# Patient Record
Sex: FEMALE | Race: WHITE | NOT HISPANIC OR LATINO | URBAN - NONMETROPOLITAN AREA
[De-identification: names, ages, dates, MRNs, and addresses within clinical notes are randomized per-mention and may not be internally consistent; named-entity substitution may affect disease eponyms.]

---

## 2021-03-16 ENCOUNTER — OFFICE VISIT (OUTPATIENT)
Dept: FAMILY MEDICINE CLINIC | Facility: CLINIC | Age: 41
End: 2021-03-16

## 2021-03-16 VITALS
DIASTOLIC BLOOD PRESSURE: 80 MMHG | WEIGHT: 192 LBS | SYSTOLIC BLOOD PRESSURE: 122 MMHG | HEART RATE: 72 BPM | HEIGHT: 64 IN | OXYGEN SATURATION: 97 % | TEMPERATURE: 97.4 F | BODY MASS INDEX: 32.78 KG/M2

## 2021-03-16 DIAGNOSIS — R10.33 PERIUMBILICAL ABDOMINAL PAIN: Primary | ICD-10-CM

## 2021-03-16 DIAGNOSIS — Z86.39 HISTORY OF HYPERLIPIDEMIA: ICD-10-CM

## 2021-03-16 DIAGNOSIS — L91.8 SKIN TAG: ICD-10-CM

## 2021-03-16 PROCEDURE — 99385 PREV VISIT NEW AGE 18-39: CPT | Performed by: NURSE PRACTITIONER

## 2021-03-16 RX ORDER — COPPER 313.4 MG/1
1 INTRAUTERINE DEVICE INTRAUTERINE ONCE
COMMUNITY

## 2021-03-16 RX ORDER — VIT C/B6/B5/MAGNESIUM/HERB 173 50-5-6-5MG
CAPSULE ORAL
COMMUNITY

## 2021-03-16 NOTE — PROGRESS NOTES
New Patient History and Physical      Referring Physician: No ref. provider found    Subjective     Chief Complaint:    Chief Complaint   Patient presents with   • Establish Care   • Suspicious Skin Lesion       History of Present Illness:   Moved here from [place]. Lives in [place].   She feels like she may have an ulcer. She notes abdomonial pain under her belly buttom about an hour after she eats. She has been taking some supplements for digestion. Notes improvement with taking those supplements.   Reports normal daily bowel movement  Does have history of marijuana abuse.  Currently using CBD only  Has hx of gerd. None now.   Has hx of ACL repair  Has hx of elevated cholesterol but was mild. She eats primarly vegetarian.   Has IUD. LMP was 3/10.   She notes a few abnormal skin lesions and skin tags.  She would like removed    Review of Systems   Gen- No fevers, chills  CV- No chest pain, palpitations  Resp- No cough, dyspnea  Neuro-No dizziness, headaches    Past Medical History:   Past Medical History:   Diagnosis Date   • GERD (gastroesophageal reflux disease)        Past Surgical History:  Past Surgical History:   Procedure Laterality Date   • KNEE ACL RECONSTRUCTION         Family History: family history includes Diabetes in her mother; Hypertension in her mother.    Social History:  reports that she quit smoking about 13 months ago. Her smoking use included cigarettes. She has never used smokeless tobacco. She reports current alcohol use. She reports that she does not use drugs.    Medications:    Current Outpatient Medications:   •  ALOE PO, Take  by mouth., Disp: , Rfl:   •  ALPHA LIPOIC ACID-BIOTIN PO, Take  by mouth., Disp: , Rfl:   •  NON FORMULARY, MUSHROOMS, Disp: , Rfl:   •  Paragard Intrauterine Copper intrauterine device IUD, 1 each by Intrauterine route 1 (One) Time., Disp: , Rfl:   •  Probiotic Product (PROBIOTIC-10 PO), Take  by mouth., Disp: , Rfl:   •  Turmeric 500 MG capsule, Take  by  "mouth., Disp: , Rfl:     Allergies:  No Known Allergies    Objective     Vital Signs:   Vitals:    03/16/21 1615   BP: 122/80   Pulse: 72   Temp: 97.4 °F (36.3 °C)   SpO2: 97%   Weight: 87.1 kg (192 lb)   Height: 162.6 cm (64\")   PainSc: 0-No pain       Physical Exam:    Physical Exam  Vitals and nursing note reviewed.   Constitutional:       Appearance: Normal appearance. She is well-developed.   HENT:      Head: Normocephalic and atraumatic.      Right Ear: Tympanic membrane, ear canal and external ear normal.      Left Ear: Tympanic membrane, ear canal and external ear normal.      Nose: Nose normal.      Mouth/Throat:      Pharynx: Uvula midline.   Eyes:      General: Lids are normal. No scleral icterus.     Conjunctiva/sclera: Conjunctivae normal.      Pupils: Pupils are equal, round, and reactive to light.   Neck:      Thyroid: No thyromegaly.      Vascular: No carotid bruit.      Trachea: No tracheal deviation.   Cardiovascular:      Rate and Rhythm: Normal rate and regular rhythm.      Pulses: Normal pulses.      Heart sounds: Normal heart sounds. No murmur. No friction rub. No gallop.    Pulmonary:      Effort: Pulmonary effort is normal.      Breath sounds: Normal breath sounds.   Abdominal:      General: Bowel sounds are normal. There is no distension.      Palpations: Abdomen is soft.      Tenderness: There is no abdominal tenderness.   Musculoskeletal:         General: No swelling.      Cervical back: Neck supple.   Lymphadenopathy:      Head:      Right side of head: No submental, submandibular, tonsillar, preauricular or posterior auricular adenopathy.      Left side of head: No submental, submandibular, tonsillar, preauricular or posterior auricular adenopathy.      Cervical: No cervical adenopathy.   Skin:     General: Skin is warm and dry.      Capillary Refill: Capillary refill takes less than 2 seconds.      Findings: No rash.      Comments: Multiple nevi and skin tags noted   Neurological:      " General: No focal deficit present.      Mental Status: She is alert and oriented to person, place, and time.      Cranial Nerves: No cranial nerve deficit.      Sensory: No sensory deficit.   Psychiatric:         Mood and Affect: Mood normal.         Behavior: Behavior normal.         Assessment / Plan     Assessment/Plan:   Problem List Items Addressed This Visit     None      Visit Diagnoses     Periumbilical abdominal pain    -  Primary    Relevant Orders    H. Pylori Breath Test - Breath, Lung    History of hyperlipidemia        Relevant Orders    Lipid Panel    Skin tag        Relevant Orders    Ambulatory Referral to Dermatology        -- physical performed today, encouraged clean eating, whole foods, limit processed and sugary foods, exercise 150min/week as tolerated  --Check H. pylori breath test.  Continue supplements.  Discussed dietary triggers.  She does not want any pharmaceutical medication at this time  --Refer to Derm for skin tag removal and evaluation of her multiple nevi  --RTC for cholesterol check    I have reviewed pertinent health maintenance applicable to this patient.    Follow up:  As needed    Electronically signed by SHERRY Lange   03/16/2021 16:34 EDT      Please note that portions of this note may have been completed with a voice recognition program. Efforts were made to edit the dictations, but occasionally words are mistranscribed.

## 2021-03-17 DIAGNOSIS — A04.8 H. PYLORI INFECTION: Primary | ICD-10-CM

## 2021-03-17 LAB
Lab: NORMAL
UREA BREATH TEST QL: POSITIVE

## 2021-03-17 RX ORDER — CLARITHROMYCIN 500 MG/1
500 TABLET, COATED ORAL 2 TIMES DAILY
Qty: 28 TABLET | Refills: 0 | Status: SHIPPED | OUTPATIENT
Start: 2021-03-17 | End: 2021-03-31

## 2021-03-17 RX ORDER — PANTOPRAZOLE SODIUM 40 MG/1
40 TABLET, DELAYED RELEASE ORAL 2 TIMES DAILY
Qty: 28 TABLET | Refills: 0 | Status: SHIPPED | OUTPATIENT
Start: 2021-03-17 | End: 2021-03-31

## 2021-03-17 RX ORDER — AMOXICILLIN 500 MG/1
1000 TABLET, FILM COATED ORAL 2 TIMES DAILY
Qty: 56 TABLET | Refills: 0 | Status: SHIPPED | OUTPATIENT
Start: 2021-03-17 | End: 2021-03-31

## 2021-03-17 NOTE — PROGRESS NOTES
H pylori positive. Antibiotics and ppi sent to the pharmacy. Needs to takes for 14 days. F/u in 6 weeks. Will repeat testing at that time. Please schedule 6 week f/u.

## 2021-03-23 ENCOUNTER — BULK ORDERING (OUTPATIENT)
Dept: CASE MANAGEMENT | Facility: OTHER | Age: 41
End: 2021-03-23

## 2021-03-23 DIAGNOSIS — Z23 IMMUNIZATION DUE: ICD-10-CM

## 2021-04-16 ENCOUNTER — OFFICE VISIT (OUTPATIENT)
Dept: OBSTETRICS AND GYNECOLOGY | Facility: CLINIC | Age: 41
End: 2021-04-16

## 2021-04-16 VITALS
BODY MASS INDEX: 34.83 KG/M2 | WEIGHT: 204 LBS | SYSTOLIC BLOOD PRESSURE: 126 MMHG | DIASTOLIC BLOOD PRESSURE: 78 MMHG | HEIGHT: 64 IN

## 2021-04-16 DIAGNOSIS — Z01.419 ENCOUNTER FOR GYNECOLOGICAL EXAMINATION WITHOUT ABNORMAL FINDING: Primary | ICD-10-CM

## 2021-04-16 PROCEDURE — 99385 PREV VISIT NEW AGE 18-39: CPT | Performed by: OBSTETRICS & GYNECOLOGY

## 2021-04-16 NOTE — PROGRESS NOTES
Subjective   Chief Complaint   Patient presents with   • Gynecologic Exam     pt has Paragard right now, it has been in 7/8   years, does not know if she wants to keep it or remove it due to all the cases with Paragard pieces coming loose      Elham Haro is a 40 y.o. year old  presenting to be seen for her annual exam.     SEXUAL Hx:  She is currently sexually active.  In the past year there has not been new sexual partners.    Condoms are not typically used.  She would not like to be screened for STD's at today's exam.  Current birth control method: IUD - Paraguard.  MENSTRUAL Hx:  No LMP recorded. Patient has had an implant.  In the past 6 months her cycles have been regular, predictable and occur monthly.   Her menstrual flow is normal.   Each month on average there are roughly 0 days of very heavy flow.    Intermenstrual bleeding is absent.    Post-coital bleeding is absent.  Dysmenorrhea: is not affecting her activities of daily living  PMS: is not affecting her activities of daily living  Her cycles are not a source of concern for her that she wishes to discuss today.  HEALTH Hx:  She exercises regularly: no (and has no plans to become more active).  She wears her seat belt:yes.  She has concerns about domestic violence: no.  OTHER COMPLAINTS:  Nothing else    The following portions of the patient's history were reviewed and updated as appropriate:  She  has a past medical history of Abnormal Pap smear of cervix, Cervical dysplasia, GERD (gastroesophageal reflux disease), HPV (human papilloma virus) infection, and Placenta accreta.  She does not have a problem list on file.  She  has a past surgical history that includes Anterior cruciate ligament repair and Cryotherapy.  Her family history includes Breast cancer in her mother; Diabetes in her mother; Hypertension in her mother.  She  reports that she quit smoking about 15 months ago. Her smoking use included cigarettes. She has never used  "smokeless tobacco. She reports current alcohol use. She reports that she does not use drugs.  Current Outpatient Medications   Medication Sig Dispense Refill   • ALOE PO Take  by mouth.     • ALPHA LIPOIC ACID-BIOTIN PO Take  by mouth.     • NON FORMULARY MUSHROOMS     • Paragard Intrauterine Copper intrauterine device IUD 1 each by Intrauterine route 1 (One) Time.     • Probiotic Product (PROBIOTIC-10 PO) Take  by mouth.     • Turmeric 500 MG capsule Take  by mouth.       No current facility-administered medications for this visit.     Current Outpatient Medications on File Prior to Visit   Medication Sig   • ALOE PO Take  by mouth.   • ALPHA LIPOIC ACID-BIOTIN PO Take  by mouth.   • NON FORMULARY MUSHROOMS   • Paragard Intrauterine Copper intrauterine device IUD 1 each by Intrauterine route 1 (One) Time.   • Probiotic Product (PROBIOTIC-10 PO) Take  by mouth.   • Turmeric 500 MG capsule Take  by mouth.     No current facility-administered medications on file prior to visit.     She has No Known Allergies..    Social History    Tobacco Use      Smoking status: Former Smoker        Types: Cigarettes        Quit date: 2020        Years since quittin.2      Smokeless tobacco: Never Used    Review of Systems  Consitutional NEG: anorexia or night sweats    POS: nothing reported   Gastointestinal NEG: bloating, change in bowel habits, melena or reflux symptoms    POS: nothing reported   Genitourinary NEG: dysuria or hematuria    POS: nothing reported   Integument NEG: moles that are changing in size, shape, color or rashes    POS: nothing reported   Breast NEG: persistent breast lump, skin dimpling or nipple discharge    POS: nothing reported          Objective   /78   Ht 162.6 cm (64\")   Wt 92.5 kg (204 lb)   BMI 35.02 kg/m²     General:  well developed; well nourished  no acute distress   Skin:  No suspicious lesions seen   Thyroid: normal to inspection and palpation   Breasts:  Examined in supine " position  Symmetric without masses or skin dimpling  Nipples normal without inversion, lesions or discharge  There are no palpable axillary nodes   Abdomen: soft, non-tender; no masses  no umbilical or inguinal hernias are present  no hepato-splenomegaly   Pelvis: Clinical staff was present for exam  External genitalia:  normal appearance of the external genitalia including Bartholin's and [place]'s glands.  :  urethral meatus normal;  Vaginal:  normal pink mucosa without prolapse or lesions.  Cervix:  normal appearance.  Uterus:  normal size, shape and consistency.  Adnexa:  normal bimanual exam of the adnexa.  Rectal:  digital rectal exam not performed; anus visually normal appearing.        Assessment   1. PE WNL     Plan   1. PAP done  2. Strings visible  3.   MMG ordered    No orders of the defined types were placed in this encounter.         This note was electronically signed.      April 16, 2021

## 2021-04-27 DIAGNOSIS — Z01.419 ENCOUNTER FOR GYNECOLOGICAL EXAMINATION WITHOUT ABNORMAL FINDING: ICD-10-CM

## 2021-05-20 ENCOUNTER — HOSPITAL ENCOUNTER (OUTPATIENT)
Dept: MAMMOGRAPHY | Facility: HOSPITAL | Age: 41
Discharge: HOME | End: 2021-05-20
Admitting: OBSTETRICS & GYNECOLOGY

## 2021-05-20 DIAGNOSIS — Z01.419 ENCOUNTER FOR GYNECOLOGICAL EXAMINATION WITHOUT ABNORMAL FINDING: ICD-10-CM

## 2021-05-20 PROCEDURE — 77063 BREAST TOMOSYNTHESIS BI: CPT

## 2021-05-20 PROCEDURE — 77067 SCR MAMMO BI INCL CAD: CPT

## 2021-06-08 ENCOUNTER — OFFICE VISIT (OUTPATIENT)
Dept: OBSTETRICS AND GYNECOLOGY | Facility: CLINIC | Age: 41
End: 2021-06-08

## 2021-06-08 VITALS
HEIGHT: 64 IN | WEIGHT: 204 LBS | BODY MASS INDEX: 34.83 KG/M2 | DIASTOLIC BLOOD PRESSURE: 74 MMHG | SYSTOLIC BLOOD PRESSURE: 122 MMHG

## 2021-06-08 DIAGNOSIS — A42.9 ACTINOMYCES INFECTION: Primary | ICD-10-CM

## 2021-06-08 PROCEDURE — 99213 OFFICE O/P EST LOW 20 MIN: CPT | Performed by: OBSTETRICS & GYNECOLOGY

## 2021-06-08 NOTE — PROGRESS NOTES
Subjective   Chief Complaint   Patient presents with   • Follow-up     swab for Actinomyces      Elham Haro is a 40 y.o. year old .  Patient's last menstrual period was 2021.  She presents to be seen because of actinomyctes on pap..     OTHER COMPLAINTS:  Nothing else    The following portions of the patient's history were reviewed and updated as appropriate:  She  has a past medical history of Abnormal Pap smear of cervix, Cervical dysplasia, GERD (gastroesophageal reflux disease), HPV (human papilloma virus) infection, and Placenta accreta.  She does not have a problem list on file.  She  has a past surgical history that includes Anterior cruciate ligament repair and Cryotherapy.  Her family history includes Breast cancer in her mother; Diabetes in her mother; Hypertension in her mother.  She  reports that she quit smoking about 16 months ago. Her smoking use included cigarettes. She has never used smokeless tobacco. She reports current alcohol use. She reports that she does not use drugs.  Current Outpatient Medications   Medication Sig Dispense Refill   • ALOE PO Take  by mouth.     • ALPHA LIPOIC ACID-BIOTIN PO Take  by mouth.     • NON FORMULARY MUSHROOMS     • Paragard Intrauterine Copper intrauterine device IUD 1 each by Intrauterine route 1 (One) Time.     • Probiotic Product (PROBIOTIC-10 PO) Take  by mouth.     • Turmeric 500 MG capsule Take  by mouth.       No current facility-administered medications for this visit.     Current Outpatient Medications on File Prior to Visit   Medication Sig   • ALOE PO Take  by mouth.   • ALPHA LIPOIC ACID-BIOTIN PO Take  by mouth.   • NON FORMULARY MUSHROOMS   • Paragard Intrauterine Copper intrauterine device IUD 1 each by Intrauterine route 1 (One) Time.   • Probiotic Product (PROBIOTIC-10 PO) Take  by mouth.   • Turmeric 500 MG capsule Take  by mouth.     No current facility-administered medications on file prior to visit.     She has No Known  "Allergies.    Social History    Tobacco Use      Smoking status: Former Smoker        Types: Cigarettes        Quit date: 2020        Years since quittin.3      Smokeless tobacco: Never Used    Review of Systems  Consitutional POS: nothing reported    NEG: anorexia or night sweats   Gastointestinal POS: nothing reported    NEG: bloating, change in bowel habits, melena or reflux symptoms   Genitourinary POS: nothing reported    NEG: dysuria or hematuria   Integument POS: nothing reported    NEG: moles that are changing in size, shape, color or rashes   Breast POS: nothing reported    NEG: persistent breast lump, skin dimpling or nipple discharge         Respiratory: negative  Cardiovascular: negative          Objective   /74   Ht 162.6 cm (64\")   Wt 92.5 kg (204 lb)   LMP 2021   BMI 35.02 kg/m²     General:  well developed; well nourished  no acute distress  appears older than stated age   Skin:  No suspicious lesions seen   Thyroid: normal to inspection and palpation   Lungs:  breathing is unlabored  clear to auscultation bilaterally   Heart:  regular rate and rhythm, S1, S2 normal, no murmur, click, rub or gallop   Breasts:  Not performed.   Abdomen: soft, non-tender; no masses  no umbilical or inguinal hernias are present  no hepato-splenomegaly   Pelvis: Clinical staff was present for exam  External genitalia:  normal appearance of the external genitalia including Bartholin's and [place]'s glands.  :  urethral meatus normal;  Vaginal:  normal pink mucosa without prolapse or lesions.  Cervix:  normal appearance.  Uterus:  normal size, shape and consistency.  Adnexa:  normal bimanual exam of the adnexa.  Rectal:  digital rectal exam not performed; anus visually normal appearing.     Psychiatric: Alert and oriented ×3, mood and affect appropriate  HEENT: Atraumatic, normocephalic, normal scleral icterus  Extremities: 2+ pulses bilaterally, no edema      Lab Review   actinomyctes on " pap    Imaging   No data reviewed        Assessment   1. 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 Cultures done  2. IUD strings visible    No orders of the defined types were placed in this encounter.         This note was electronically signed.      June 8, 2021

## 2021-06-14 LAB
BACTERIA SPEC AEROBE CULT: ABNORMAL
BACTERIA SPEC ANAEROBE CULT: ABNORMAL
BACTERIA SPEC CULT: ABNORMAL
OTHER ANTIBIOTIC SUSC ISLT: ABNORMAL

## 2021-06-15 RX ORDER — SULFAMETHOXAZOLE AND TRIMETHOPRIM 800; 160 MG/1; MG/1
1 TABLET ORAL 2 TIMES DAILY
Qty: 20 TABLET | Refills: 0 | Status: SHIPPED | OUTPATIENT
Start: 2021-06-15 | End: 2021-06-25

## 2021-06-15 NOTE — PROGRESS NOTES
Please let the patient know while actinomyces were not noted on the culture bacterial infection called Pseudomonas was.  Bactrim DS 1 by mouth twice a day for 10 days has been called in.  She needs to come in and we need to go ahead and remove this copper T IUD as well.  Thank you

## 2021-06-22 ENCOUNTER — OFFICE VISIT (OUTPATIENT)
Dept: OBSTETRICS AND GYNECOLOGY | Facility: CLINIC | Age: 41
End: 2021-06-22

## 2021-06-22 VITALS
BODY MASS INDEX: 34.83 KG/M2 | SYSTOLIC BLOOD PRESSURE: 122 MMHG | DIASTOLIC BLOOD PRESSURE: 80 MMHG | HEIGHT: 64 IN | WEIGHT: 204 LBS

## 2021-06-22 DIAGNOSIS — Z30.432 ENCOUNTER FOR IUD REMOVAL: Primary | ICD-10-CM

## 2021-06-22 PROCEDURE — 99213 OFFICE O/P EST LOW 20 MIN: CPT | Performed by: OBSTETRICS & GYNECOLOGY

## 2021-06-22 PROCEDURE — 58301 REMOVE INTRAUTERINE DEVICE: CPT | Performed by: OBSTETRICS & GYNECOLOGY

## 2021-06-22 NOTE — PROGRESS NOTES
Subjective   Chief Complaint   Patient presents with   • iud removal     Elham Haro is a 40 y.o. year old .  No LMP recorded. (Menstrual status: Other).  She presents to be seen because of Psedomonsuas on crevcal cultre with PAraguard-- menses are very heavy and she wants it out anyway..     OTHER COMPLAINTS:  Nothing else    The following portions of the patient's history were reviewed and updated as appropriate:  She  has a past medical history of Abnormal Pap smear of cervix, Cervical dysplasia, GERD (gastroesophageal reflux disease), HPV (human papilloma virus) infection, and Placenta accreta.  She does not have a problem list on file.  She  has a past surgical history that includes Anterior cruciate ligament repair and Cryotherapy.  Her family history includes Breast cancer in her mother; Diabetes in her mother; Hypertension in her mother.  She  reports that she quit smoking about 17 months ago. Her smoking use included cigarettes. She has never used smokeless tobacco. She reports current alcohol use. She reports that she does not use drugs.  Current Outpatient Medications   Medication Sig Dispense Refill   • ALOE PO Take  by mouth.     • ALPHA LIPOIC ACID-BIOTIN PO Take  by mouth.     • NON FORMULARY MUSHROOMS     • Paragard Intrauterine Copper intrauterine device IUD 1 each by Intrauterine route 1 (One) Time.     • Probiotic Product (PROBIOTIC-10 PO) Take  by mouth.     • sulfamethoxazole-trimethoprim (Bactrim DS) 800-160 MG per tablet Take 1 tablet by mouth 2 (Two) Times a Day for 10 days. 20 tablet 0   • Turmeric 500 MG capsule Take  by mouth.       No current facility-administered medications for this visit.     Current Outpatient Medications on File Prior to Visit   Medication Sig   • ALOE PO Take  by mouth.   • ALPHA LIPOIC ACID-BIOTIN PO Take  by mouth.   • NON FORMULARY MUSHROOMS   • Paragard Intrauterine Copper intrauterine device IUD 1 each by Intrauterine route 1 (One) Time.   •  "Probiotic Product (PROBIOTIC-10 PO) Take  by mouth.   • sulfamethoxazole-trimethoprim (Bactrim DS) 800-160 MG per tablet Take 1 tablet by mouth 2 (Two) Times a Day for 10 days.   • Turmeric 500 MG capsule Take  by mouth.     No current facility-administered medications on file prior to visit.     She has No Known Allergies.    Social History    Tobacco Use      Smoking status: Former Smoker        Types: Cigarettes        Quit date: 2020        Years since quittin.4      Smokeless tobacco: Never Used    Review of Systems  Consitutional POS: nothing reported    NEG: anorexia or night sweats   Gastointestinal POS: nothing reported    NEG: bloating, change in bowel habits, melena or reflux symptoms   Genitourinary POS: nothing reported    NEG: dysuria or hematuria   Integument POS: nothing reported    NEG: moles that are changing in size, shape, color or rashes   Breast POS: nothing reported    NEG: persistent breast lump, skin dimpling or nipple discharge         Pertinent items are noted in HPI.          Objective   /80   Ht 162.6 cm (64\")   Wt 92.5 kg (204 lb)   BMI 35.02 kg/m²     General:  well developed; well nourished  no acute distress   Skin:  Not performed.   Thyroid: not examined   Lungs:  breathing is unlabored   Heart:  Not performed.   Breasts:  Not performed.   Abdomen: Not performed.   Pelvis: Clinical staff was present for exam  External genitalia:  normal appearance of the external genitalia including [place]tholin's and [place]'s glands.  :  urethral meatus normal;  Vaginal:  normal pink mucosa without prolapse or lesions.  Cervix:  normal appearance. IUD string present - 1.5 cms in length;  Uterus:  normal size, shape and consistency.  Adnexa:  normal bimanual exam of the adnexa.  Rectal:  digital rectal exam not performed; anus visually normal appearing.     Psychiatric: Alert and oriented ×3, mood and affect appropriate  HEENT: Atraumatic, normocephalic, normal scleral " icterus  Extremities: 2+ pulses bilaterally, no edema      Lab Review   culture as noted above    Imaging   No data reviewed        Assessment   1. Pseudomonas on culture after actinomyces noted on Pap.     Plan   1. ParaGard copper T IUD removed intact without complication.  Patient tolerated well.  2.     No orders of the defined types were placed in this encounter.         This note was electronically signed.      June 22, 2021